# Patient Record
Sex: FEMALE | Race: OTHER
[De-identification: names, ages, dates, MRNs, and addresses within clinical notes are randomized per-mention and may not be internally consistent; named-entity substitution may affect disease eponyms.]

---

## 2017-04-17 ENCOUNTER — HOSPITAL ENCOUNTER (EMERGENCY)
Dept: HOSPITAL 62 - ER | Age: 47
Discharge: HOME | End: 2017-04-17
Payer: COMMERCIAL

## 2017-04-17 VITALS — SYSTOLIC BLOOD PRESSURE: 119 MMHG | DIASTOLIC BLOOD PRESSURE: 68 MMHG

## 2017-04-17 DIAGNOSIS — R06.02: ICD-10-CM

## 2017-04-17 DIAGNOSIS — J40: Primary | ICD-10-CM

## 2017-04-17 LAB
ALBUMIN SERPL-MCNC: 4.3 G/DL (ref 3.5–5)
ALP SERPL-CCNC: 93 U/L (ref 38–126)
ALT SERPL-CCNC: 32 U/L (ref 9–52)
ANION GAP SERPL CALC-SCNC: 14 MMOL/L (ref 5–19)
AST SERPL-CCNC: 20 U/L (ref 14–36)
BASOPHILS # BLD AUTO: 0 10^3/UL (ref 0–0.2)
BASOPHILS NFR BLD AUTO: 0.4 % (ref 0–2)
BILIRUB DIRECT SERPL-MCNC: 0.2 MG/DL (ref 0–0.4)
BILIRUB SERPL-MCNC: 0.5 MG/DL (ref 0.2–1.3)
BUN SERPL-MCNC: 5 MG/DL (ref 7–20)
CALCIUM: 9.6 MG/DL (ref 8.4–10.2)
CHLORIDE SERPL-SCNC: 106 MMOL/L (ref 98–107)
CK SERPL-CCNC: 73 U/L (ref 30–135)
CO2 SERPL-SCNC: 25 MMOL/L (ref 22–30)
CREAT SERPL-MCNC: 0.55 MG/DL (ref 0.52–1.25)
EOSINOPHIL # BLD AUTO: 0.3 10^3/UL (ref 0–0.6)
EOSINOPHIL NFR BLD AUTO: 3 % (ref 0–6)
ERYTHROCYTE [DISTWIDTH] IN BLOOD BY AUTOMATED COUNT: 14.8 % (ref 11.5–14)
GLUCOSE SERPL-MCNC: 118 MG/DL (ref 75–110)
HCT VFR BLD CALC: 35 % (ref 36–47)
HGB BLD-MCNC: 12.1 G/DL (ref 12–15.5)
HGB HCT DIFFERENCE: 1.3
LYMPHOCYTES # BLD AUTO: 3 10^3/UL (ref 0.5–4.7)
LYMPHOCYTES NFR BLD AUTO: 27.7 % (ref 13–45)
MCH RBC QN AUTO: 27.4 PG (ref 27–33.4)
MCHC RBC AUTO-ENTMCNC: 34.6 G/DL (ref 32–36)
MCV RBC AUTO: 79 FL (ref 80–97)
MONOCYTES # BLD AUTO: 0.7 10^3/UL (ref 0.1–1.4)
MONOCYTES NFR BLD AUTO: 6.9 % (ref 3–13)
NEUTROPHILS # BLD AUTO: 6.6 10^3/UL (ref 1.7–8.2)
NEUTS SEG NFR BLD AUTO: 62 % (ref 42–78)
POTASSIUM SERPL-SCNC: 3.8 MMOL/L (ref 3.6–5)
PROT SERPL-MCNC: 7.7 G/DL (ref 6.3–8.2)
RBC # BLD AUTO: 4.43 10^6/UL (ref 3.72–5.28)
SODIUM SERPL-SCNC: 144.8 MMOL/L (ref 137–145)
TROPONIN I SERPL-MCNC: < 0.012 NG/ML
WBC # BLD AUTO: 10.7 10^3/UL (ref 4–10.5)

## 2017-04-17 PROCEDURE — 85379 FIBRIN DEGRADATION QUANT: CPT

## 2017-04-17 PROCEDURE — 84484 ASSAY OF TROPONIN QUANT: CPT

## 2017-04-17 PROCEDURE — 82550 ASSAY OF CK (CPK): CPT

## 2017-04-17 PROCEDURE — 99285 EMERGENCY DEPT VISIT HI MDM: CPT

## 2017-04-17 PROCEDURE — 93005 ELECTROCARDIOGRAM TRACING: CPT

## 2017-04-17 PROCEDURE — 36415 COLL VENOUS BLD VENIPUNCTURE: CPT

## 2017-04-17 PROCEDURE — 80053 COMPREHEN METABOLIC PANEL: CPT

## 2017-04-17 PROCEDURE — 83880 ASSAY OF NATRIURETIC PEPTIDE: CPT

## 2017-04-17 PROCEDURE — 84703 CHORIONIC GONADOTROPIN ASSAY: CPT

## 2017-04-17 PROCEDURE — 85025 COMPLETE CBC W/AUTO DIFF WBC: CPT

## 2017-04-17 PROCEDURE — 71020: CPT

## 2017-04-17 PROCEDURE — 94640 AIRWAY INHALATION TREATMENT: CPT

## 2017-04-17 PROCEDURE — 93010 ELECTROCARDIOGRAM REPORT: CPT

## 2017-04-17 NOTE — EKG REPORT
SEVERITY:- BORDERLINE ECG -

SINUS RHYTHM

BORDERLINE T ABNORMALITIES, INFERIOR LEADS

:

Confirmed by: Makenzie Gooden 17-Apr-2017 08:41:06

## 2017-04-17 NOTE — ER DOCUMENT REPORT
ED Respiratory Problem





- General


Chief Complaint: Shortness Of Breath


Stated Complaint: DIFFICULTY BREATHING


Mode of Arrival: Ambulatory


Information source: Patient


TRAVEL OUTSIDE OF THE [place] IN LAST 30 DAYS: No





- HPI


Patient complains to provider of: Cough, Short of breath


Notes: 


Patient arrives with complaints of chest tightness and shortness of breath that 

started earlier today.  She states for the last few days she has had a cough.  

She says she's had some chest congestion.  She had 3 episodes throughout the 

evening where she felt short of breath and felt wheezy and felt like she was 

having chest tightness.  She denies any actual chest pain.  Right now she still 

feels some mild shortness of breath.  She denies any fever.  She denies any 

history of asthma, hypertension, high cholesterol, CAD, smoking, diabetes.  She 

denies any leg pain or swelling, no hormone use, no cancer, no history of DVT 

or PE.  She did recently drive from [place] to [place] to see her son.  

She is actually supposed to drive back home today.  She states that she used a 

nephew's inhaler which seemed to make her symptoms better when she was having 

one of her "attacks ".  Patient denies any abdominal pain.  She denies any 

nausea, vomiting, diarrhea.  No rash.  Nothing in particular seems to make her 

symptoms better or worse.





- Related Data


Allergies/Adverse Reactions: 


 





No Known Allergies Allergy (Verified 04/17/17 03:55)


 











Past Medical History





- Social History


Smoking Status: Unknown if Ever Smoked


Family History: Reviewed & Not Pertinent


Renal/ Medical History: Denies: Hx Peritoneal Dialysis





Review of Systems





- Review of Systems


-: Yes All other systems reviewed and negative





Physical Exam





- Vital signs


Vitals: 


 











Temp Pulse Resp BP Pulse Ox


 


 97.8 F   96   24 H  110/66   99 


 


 04/17/17 03:55  04/17/17 03:55  04/17/17 03:55  04/17/17 03:55  04/17/17 03:55














- Notes


Notes: 


GENERAL: alert, cooperative, nontoxic, no distress.


HEAD: normocephalic, atraumatic


EYES: conjunctiva pink without discharge, no external redness or swelling.


EARS: no external swelling, no external redness


NOSE: atraumatic, no external swelling


MOUTH/THROAT: mucous membranes moist and pink, posterior pharynx without 

erythema, swelling, exudate. No trismus or drooling.


NECK: soft, supple, full range of motion, no meningismus.


CHEST: no distress, lungs clear and equal throughout.  No wheezing, rales, 

rhonchi.


CARDIAC: regular rate and rhythm, no murmur, normal capillary refill, normal 

pulses.  No peripheral edema noted.


ABDOMEN: Soft, nontender.


BACK: full range of motion, no CVA tenderness.


EXTREMITIES: full range of motion of all extremities.  No redness, no swelling.


NEURO: alert and oriented 3, no focal deficits, full range of motion of all 

extremities.


PYSCH: appropriate mood, affect.  Patient is cooperative.


SKIN: pink, warm, dry, no rash.





Course





- Re-evaluation


Re-evalutation: 


04/17/17 05:37


Patient is nontoxic.  Stable vitals.  She is not hypoxic.  Patient states she's 

had a cough and congestion for the last several days has had a few episodes of 

wheezing and feeling like she was having trouble breathing earlier today.  

Patient has no CAD risk factors.  She has a hard score of 2.  EKG is 

unremarkable for acute findings.  Troponin is negative.  The patient has no d-

dimer risk factors aside from recent long drive.  PE is unlikely in this 

patient and her d-dimer is negative.  The patient likely has bronchitis.  She 

was given breathing treatment and steroids here in the emergency department.  

At this point patient can be discharged home wit steroids and an inhaler as 

well as some cough medication.  She was instructed to follow-up with her 

primary care doctor at the next available appointment.  Follow up sooner if she 

develops chest pain, significant difficulty breathing, high fevers, or any 

further concerns.





The patient's emergency department workup and current diagnosis were explained 

to the patient and or family.  Follow-up instructions were provided.  

Medications if prescribed were discussed. Instructions for when to return to 

the emergency department including specific  worrisome symptoms were discussed 

with the patient and/or family.





04/17/17 05:41


Heart Score


2 points


Low Score (0-3 points)





Risk of MACE of 0.9-1.7%.





- Vital Signs


Vital signs: 


 











Temp Pulse Resp BP Pulse Ox


 


 97.8 F   96   24 H  110/66   99 


 


 04/17/17 03:55  04/17/17 03:55  04/17/17 03:55  04/17/17 03:55  04/17/17 03:55














- Laboratory


Result Diagrams: 


 04/17/17 04:50





 04/17/17 04:50


Laboratory results interpreted by me: 


 











  04/17/17 04/17/17





  04:50 04:50


 


WBC  10.7 H 


 


Hct  35.0 L 


 


MCV  79 L 


 


RDW  14.8 H 


 


BUN   5 L


 


Glucose   118 H














- Diagnostic Test


Radiology reviewed: Image reviewed, Reports reviewed - Chest x-ray clear





- EKG Interpretation by Me


EKG shows normal: Sinus rhythm, Axis, Intervals, QRS Complexes


Rate: Normal


When compared to previous EKG there are: Previous EKG unavailable


Additional EKG results interpreted by me: 





04/17/17 05:37


Slightly flattened T waves in lead 3 and aVF





Discharge





- Discharge


Clinical Impression: 


 Bronchitis





Condition: Stable


Disposition: HOME, SELF-CARE


Instructions:  Bronchitis ([facility])


Additional Instructions: 


Take medications as prescribed.  Follow-up with your family doctor at the next 

available appointment.  Follow-up sooner for difficulty breathing, chest pain, 

high fever, or any further concerns.





The medication you were prescribed today may cause drowsiness.  Do not drive or 

operate heavy machinery while taking this medication.


Prescriptions: 


Hydrocodone Bit/Homatropine [Hycodan Syrup 5-1.5 mg/5 ml Ud Cup] 5 ml PO Q4HP 

PRN #120 ml


 PRN Reason: 


RX: Albuterol Sulfate [Proair HFA Inhalation Aerosol 8.5 gm MDI] 2 puff IH Q4 

PRN #1 mdi


 PRN Reason: 


RX: Prednisone 60 mg PO DAILY #15 tablet

## 2024-05-13 ENCOUNTER — APPOINTMENT (OUTPATIENT)
Dept: NEUROPSYCHOLOGY | Facility: CLINIC | Age: 54
End: 2024-05-13
Payer: MEDICAID

## 2024-05-13 PROBLEM — Z00.00 ENCOUNTER FOR PREVENTIVE HEALTH EXAMINATION: Status: ACTIVE | Noted: 2024-05-13

## 2024-05-13 PROCEDURE — 90791 PSYCH DIAGNOSTIC EVALUATION: CPT

## 2024-05-21 ENCOUNTER — APPOINTMENT (OUTPATIENT)
Dept: NEUROPSYCHOLOGY | Facility: CLINIC | Age: 54
End: 2024-05-21
Payer: MEDICAID

## 2024-05-21 PROCEDURE — 90834 PSYTX W PT 45 MINUTES: CPT

## 2024-05-28 ENCOUNTER — APPOINTMENT (OUTPATIENT)
Dept: NEUROPSYCHOLOGY | Facility: CLINIC | Age: 54
End: 2024-05-28

## 2024-05-30 ENCOUNTER — APPOINTMENT (OUTPATIENT)
Dept: NEUROPSYCHOLOGY | Facility: CLINIC | Age: 54
End: 2024-05-30

## 2024-06-03 ENCOUNTER — APPOINTMENT (OUTPATIENT)
Dept: NEUROPSYCHOLOGY | Facility: CLINIC | Age: 54
End: 2024-06-03
Payer: MEDICAID

## 2024-06-03 PROCEDURE — 90834 PSYTX W PT 45 MINUTES: CPT

## 2024-06-13 ENCOUNTER — APPOINTMENT (OUTPATIENT)
Dept: NEUROPSYCHOLOGY | Facility: CLINIC | Age: 54
End: 2024-06-13

## 2024-06-18 ENCOUNTER — APPOINTMENT (OUTPATIENT)
Dept: NEUROPSYCHOLOGY | Facility: CLINIC | Age: 54
End: 2024-06-18
Payer: MEDICAID

## 2024-06-18 PROCEDURE — 90834 PSYTX W PT 45 MINUTES: CPT

## 2024-06-25 ENCOUNTER — APPOINTMENT (OUTPATIENT)
Dept: NEUROPSYCHOLOGY | Facility: CLINIC | Age: 54
End: 2024-06-25

## 2024-06-26 ENCOUNTER — APPOINTMENT (OUTPATIENT)
Dept: NEUROPSYCHOLOGY | Facility: CLINIC | Age: 54
End: 2024-06-26
Payer: MEDICAID

## 2024-06-26 PROCEDURE — 90834 PSYTX W PT 45 MINUTES: CPT

## 2024-07-02 ENCOUNTER — APPOINTMENT (OUTPATIENT)
Dept: NEUROPSYCHOLOGY | Facility: CLINIC | Age: 54
End: 2024-07-02
Payer: MEDICAID

## 2024-07-02 PROCEDURE — 90834 PSYTX W PT 45 MINUTES: CPT | Mod: 95

## 2024-07-09 ENCOUNTER — APPOINTMENT (OUTPATIENT)
Dept: NEUROPSYCHOLOGY | Facility: CLINIC | Age: 54
End: 2024-07-09
Payer: MEDICAID

## 2024-07-09 PROCEDURE — 90834 PSYTX W PT 45 MINUTES: CPT

## 2024-07-16 ENCOUNTER — APPOINTMENT (OUTPATIENT)
Dept: NEUROPSYCHOLOGY | Facility: CLINIC | Age: 54
End: 2024-07-16
Payer: MEDICAID

## 2024-07-16 PROCEDURE — 90834 PSYTX W PT 45 MINUTES: CPT

## 2024-07-23 ENCOUNTER — APPOINTMENT (OUTPATIENT)
Dept: NEUROPSYCHOLOGY | Facility: CLINIC | Age: 54
End: 2024-07-23
Payer: MEDICAID

## 2024-07-23 PROCEDURE — 90834 PSYTX W PT 45 MINUTES: CPT

## 2024-07-30 ENCOUNTER — APPOINTMENT (OUTPATIENT)
Dept: NEUROPSYCHOLOGY | Facility: CLINIC | Age: 54
End: 2024-07-30
Payer: MEDICAID

## 2024-07-30 PROCEDURE — 90834 PSYTX W PT 45 MINUTES: CPT

## 2024-08-06 ENCOUNTER — APPOINTMENT (OUTPATIENT)
Dept: NEUROPSYCHOLOGY | Facility: CLINIC | Age: 54
End: 2024-08-06

## 2024-08-13 ENCOUNTER — APPOINTMENT (OUTPATIENT)
Dept: NEUROPSYCHOLOGY | Facility: CLINIC | Age: 54
End: 2024-08-13
Payer: MEDICAID

## 2024-08-13 PROCEDURE — 90834 PSYTX W PT 45 MINUTES: CPT

## 2024-08-20 ENCOUNTER — APPOINTMENT (OUTPATIENT)
Dept: NEUROPSYCHOLOGY | Facility: CLINIC | Age: 54
End: 2024-08-20
Payer: MEDICAID

## 2024-08-20 PROCEDURE — 90834 PSYTX W PT 45 MINUTES: CPT

## 2024-08-27 ENCOUNTER — APPOINTMENT (OUTPATIENT)
Dept: NEUROPSYCHOLOGY | Facility: CLINIC | Age: 54
End: 2024-08-27
Payer: MEDICAID

## 2024-08-27 PROCEDURE — 90834 PSYTX W PT 45 MINUTES: CPT

## 2024-09-03 ENCOUNTER — APPOINTMENT (OUTPATIENT)
Dept: NEUROPSYCHOLOGY | Facility: CLINIC | Age: 54
End: 2024-09-03
Payer: MEDICAID

## 2024-09-03 PROCEDURE — 90834 PSYTX W PT 45 MINUTES: CPT

## 2024-09-10 ENCOUNTER — APPOINTMENT (OUTPATIENT)
Dept: NEUROPSYCHOLOGY | Facility: CLINIC | Age: 54
End: 2024-09-10
Payer: MEDICAID

## 2024-09-10 PROCEDURE — 90834 PSYTX W PT 45 MINUTES: CPT

## 2024-09-17 ENCOUNTER — APPOINTMENT (OUTPATIENT)
Dept: NEUROPSYCHOLOGY | Facility: CLINIC | Age: 54
End: 2024-09-17
Payer: MEDICAID

## 2024-09-17 PROCEDURE — 90834 PSYTX W PT 45 MINUTES: CPT

## 2024-09-24 ENCOUNTER — APPOINTMENT (OUTPATIENT)
Dept: NEUROPSYCHOLOGY | Facility: CLINIC | Age: 54
End: 2024-09-24
Payer: MEDICAID

## 2024-09-24 PROCEDURE — 90834 PSYTX W PT 45 MINUTES: CPT

## 2024-10-01 ENCOUNTER — APPOINTMENT (OUTPATIENT)
Dept: NEUROPSYCHOLOGY | Facility: CLINIC | Age: 54
End: 2024-10-01
Payer: MEDICAID

## 2024-10-01 PROCEDURE — 90834 PSYTX W PT 45 MINUTES: CPT

## 2024-10-08 ENCOUNTER — APPOINTMENT (OUTPATIENT)
Dept: NEUROPSYCHOLOGY | Facility: CLINIC | Age: 54
End: 2024-10-08
Payer: MEDICAID

## 2024-10-08 PROCEDURE — 90834 PSYTX W PT 45 MINUTES: CPT

## 2024-10-15 ENCOUNTER — APPOINTMENT (OUTPATIENT)
Dept: NEUROPSYCHOLOGY | Facility: CLINIC | Age: 54
End: 2024-10-15

## 2024-10-22 ENCOUNTER — APPOINTMENT (OUTPATIENT)
Dept: NEUROPSYCHOLOGY | Facility: CLINIC | Age: 54
End: 2024-10-22
Payer: MEDICAID

## 2024-10-22 PROCEDURE — 90834 PSYTX W PT 45 MINUTES: CPT

## 2024-10-29 ENCOUNTER — APPOINTMENT (OUTPATIENT)
Dept: NEUROPSYCHOLOGY | Facility: CLINIC | Age: 54
End: 2024-10-29
Payer: MEDICAID

## 2024-10-29 PROCEDURE — 90834 PSYTX W PT 45 MINUTES: CPT

## 2024-11-05 ENCOUNTER — APPOINTMENT (OUTPATIENT)
Dept: NEUROPSYCHOLOGY | Facility: CLINIC | Age: 54
End: 2024-11-05
Payer: MEDICAID

## 2024-11-05 PROCEDURE — 90834 PSYTX W PT 45 MINUTES: CPT

## 2024-11-12 ENCOUNTER — APPOINTMENT (OUTPATIENT)
Dept: NEUROPSYCHOLOGY | Facility: CLINIC | Age: 54
End: 2024-11-12
Payer: MEDICAID

## 2024-11-12 PROCEDURE — 90834 PSYTX W PT 45 MINUTES: CPT

## 2024-11-18 ENCOUNTER — APPOINTMENT (OUTPATIENT)
Dept: NEUROPSYCHOLOGY | Facility: CLINIC | Age: 54
End: 2024-11-18
Payer: MEDICAID

## 2024-11-18 PROCEDURE — 90834 PSYTX W PT 45 MINUTES: CPT

## 2024-11-26 ENCOUNTER — APPOINTMENT (OUTPATIENT)
Dept: NEUROPSYCHOLOGY | Facility: CLINIC | Age: 54
End: 2024-11-26
Payer: MEDICAID

## 2024-11-26 PROCEDURE — 90834 PSYTX W PT 45 MINUTES: CPT

## 2024-12-03 ENCOUNTER — APPOINTMENT (OUTPATIENT)
Dept: NEUROPSYCHOLOGY | Facility: CLINIC | Age: 54
End: 2024-12-03

## 2024-12-10 ENCOUNTER — APPOINTMENT (OUTPATIENT)
Dept: NEUROPSYCHOLOGY | Facility: CLINIC | Age: 54
End: 2024-12-10

## 2024-12-17 ENCOUNTER — APPOINTMENT (OUTPATIENT)
Dept: NEUROPSYCHOLOGY | Facility: CLINIC | Age: 54
End: 2024-12-17

## 2024-12-24 ENCOUNTER — APPOINTMENT (OUTPATIENT)
Dept: NEUROPSYCHOLOGY | Facility: CLINIC | Age: 54
End: 2024-12-24

## 2024-12-31 ENCOUNTER — APPOINTMENT (OUTPATIENT)
Dept: NEUROPSYCHOLOGY | Facility: CLINIC | Age: 54
End: 2024-12-31

## 2025-01-07 ENCOUNTER — APPOINTMENT (OUTPATIENT)
Dept: NEUROPSYCHOLOGY | Facility: CLINIC | Age: 55
End: 2025-01-07

## 2025-01-09 ENCOUNTER — EMERGENCY (EMERGENCY)
Facility: HOSPITAL | Age: 55
LOS: 0 days | Discharge: HOME | End: 2025-01-09
Attending: STUDENT IN AN ORGANIZED HEALTH CARE EDUCATION/TRAINING PROGRAM
Payer: MEDICAID

## 2025-01-09 VITALS
HEART RATE: 80 BPM | TEMPERATURE: 98 F | DIASTOLIC BLOOD PRESSURE: 55 MMHG | SYSTOLIC BLOOD PRESSURE: 126 MMHG | WEIGHT: 210.1 LBS | OXYGEN SATURATION: 99 % | RESPIRATION RATE: 18 BRPM

## 2025-01-09 VITALS — OXYGEN SATURATION: 99 % | HEART RATE: 89 BPM | RESPIRATION RATE: 20 BRPM

## 2025-01-09 DIAGNOSIS — I10 ESSENTIAL (PRIMARY) HYPERTENSION: ICD-10-CM

## 2025-01-09 DIAGNOSIS — J45.909 UNSPECIFIED ASTHMA, UNCOMPLICATED: ICD-10-CM

## 2025-01-09 DIAGNOSIS — R06.02 SHORTNESS OF BREATH: ICD-10-CM

## 2025-01-09 DIAGNOSIS — L50.9 URTICARIA, UNSPECIFIED: ICD-10-CM

## 2025-01-09 DIAGNOSIS — R21 RASH AND OTHER NONSPECIFIC SKIN ERUPTION: ICD-10-CM

## 2025-01-09 PROCEDURE — 99284 EMERGENCY DEPT VISIT MOD MDM: CPT

## 2025-01-09 PROCEDURE — 99283 EMERGENCY DEPT VISIT LOW MDM: CPT

## 2025-01-09 RX ORDER — DIPHENHYDRAMINE HCL 25 MG
25 TABLET ORAL ONCE
Refills: 0 | Status: COMPLETED | OUTPATIENT
Start: 2025-01-09 | End: 2025-01-09

## 2025-01-09 RX ORDER — DEXAMETHASONE SODIUM PHOSPHATE 4 MG/ML
10 VIAL (ML) INJECTION ONCE
Refills: 0 | Status: COMPLETED | OUTPATIENT
Start: 2025-01-09 | End: 2025-01-09

## 2025-01-09 RX ORDER — PREDNISONE 5 MG
2 TABLET ORAL
Qty: 6 | Refills: 0
Start: 2025-01-09 | End: 2025-01-11

## 2025-01-09 RX ADMIN — Medication 25 MILLIGRAM(S): at 21:08

## 2025-01-09 RX ADMIN — Medication 10 MILLIGRAM(S): at 20:48

## 2025-01-09 NOTE — ED ADULT TRIAGE NOTE - CHIEF COMPLAINT QUOTE
Patient bibems in NAD a+ox3 co possible allergic reaction, states she had some chicken then developed hives with itching - no known allergies and denies any new meds/food- 25mg benadryl taken by patient prior to arrival. Denies vomiting or trouble swallowing- pt speaking in full sentences. Pt endorses some SOB- evaluated and cleared by leticia TRUJILLO

## 2025-01-09 NOTE — ED PROVIDER NOTE - PHYSICAL EXAMINATION
VITAL SIGNS: I have reviewed nursing notes and confirm.  CONSTITUTIONAL: well-appearing, non-toxic, NAD  SKIN: Warm dry, normal skin turgor, urticarial rash to bilatera UE, neck, face.   HEAD: NCAT  EYES: EOMI, PERRLA, no scleral icterus, normal conjunctiva  ENT: Moist mucous membranes, OR clear. Normal pharynx, no edema.   NECK: Supple; non tender. Full ROM. No cervical LAD  CARD: RRR, no murmurs, rubs or gallops  RESP: clear to ausculation b/l.  No rales, rhonchi, or wheezing. No increased WOB.  ABD: soft, + BS, non-tender, non-distended, no rebound or guarding. No CVA tenderness  EXT: Full ROM, no bony tenderness, pulses intact in bilateral UE and LE, no pedal edema, no calf tenderness  NEURO: normal motor. normal sensory. Normal gait.  PSYCH: Cooperative, appropriate.

## 2025-01-09 NOTE — ED PROVIDER NOTE - CARE PROVIDER_API CALL
HECTOR CLARK  [address] [place] [id number]  Phone: ([phone]  Fax: ()-  Follow Up Time: 7-10 Days

## 2025-01-09 NOTE — ED PROVIDER NOTE - NSFOLLOWUPINSTRUCTIONS_ED_ALL_ED_FT
Our Emergency Department Referral Coordinators will be reaching out to you in the next 24-48 hours from 9:00am to 5:00pm to schedule a follow up appointment. Please expect a phone call from the hospital in that time frame. If you do not receive a call or if you have any questions or concerns, you can reach them at   [phone].    Hives  Hives (urticaria) are itchy, red, swollen areas of skin. They can show up on any part of the body. They often fade within 24 hours (acute hives). If you get new hives after the old ones fade and the cycle goes on for many days or weeks, it is called chronic hives. Hives do not spread from person to person (are not contagious).    Hives can happen when your body reacts to something you are allergic to (allergen) or to something that irritates your skin. When you are exposed to something that triggers hives, your body releases a chemical called histamine. This causes redness, itching, and swelling. Hives can show up right after you are exposed to a trigger or hours later.    What are the causes?  Hives may be caused by:  Food allergies.  Insect bites or stings.  Allergies to pollen or pets.  Spending time in sunlight, heat, or cold (exposure).  Exercise.  Stress.  You can also get hives from other conditions and treatments. These include:  Viruses, such as the common cold.  Bacterial infections, such as urinary tract infections and strep throat.  Certain medicines.  Contact with latex or chemicals.  Allergy shots.  Blood transfusions.  In some cases, the cause of hives is not known (idiopathic hives).    What increases the risk?  You are more likely to get hives if:  You are female.  You have food allergies. Hives are more common if you are allergic to citrus fruits, milk, eggs, peanuts, tree nuts, or shellfish.  You are allergic to:  Medicines.  Latex.  Insects.  Animals.  Pollen.  What are the signs or symptoms?  A red rash on a person's upper arm.  Common symptoms of hives include raised, itchy, red or white bumps or patches on your skin. These areas may:  Become large and swollen (welts).  Quickly change shape and location. This may happen more than once.  Be separate hives or connect over a large area of skin.  Sting or become painful.  Turn white when pressed in the center (zayra).  In severe cases, your hands, feet, and face may also become swollen. This may happen if hives form deeper in your skin.    How is this diagnosed?  Hives may be diagnosed based on your symptoms, medical history, and a physical exam.  You may have skin, pee (urine), or blood tests done. These can help find out what is causing your hives and rule out other health issues.  You may also have a biopsy done. This is when a small piece of skin is removed for testing.  How is this treated?  Treatment for hives depends on the cause and on how severe your symptoms are. You may be told to use cool, wet cloths (cool compresses) or to take cool showers to relieve itching. Treatment may also include:  Medicines to help:  Relieve itching (antihistamines).  Reduce swelling (corticosteroids).  Treat infection (antibiotics).  An injectable medicine called omalizumab. You may need this if you have chronic idiopathic hives and still have symptoms even after you are treated with antihistamines.  In severe cases, you may need to use a device filled with medicine that gives an emergency shot of epinephrine (auto-injector pen) to prevent a very bad allergic reaction (anaphylactic reaction).    Follow these instructions at home:  Medicines    Take and apply over-the-counter and prescription medicines only as told by your health care provider.  If you were prescribed antibiotics, take them as told by your provider. Do not stop using the antibiotic even if you start to feel better.  Skin care    Apply cool compresses to the affected areas.  Do not scratch or rub your skin.  General instructions    Do not take hot showers or baths. This can make itching worse.  Do not wear tight-fitting clothing.  Use sunscreen. Wear protective clothing when you are outside.  Avoid anything that causes your hives. Keep a journal to help track what causes your hives. Write down:  What medicines you take.  What you eat and drink.  What products you use on your skin.  Keep all follow-up visits. Your provider will track how well treatment is working.  Contact a health care provider if:  Your symptoms do not get better with medicine.  Your joints are painful or swollen.  You have a fever.  You have pain in your abdomen.  Get help right away if:  Your tongue, lips, or eyelids swell.  Your chest or throat feels tight.  You have trouble breathing or swallowing.  These symptoms may be an emergency. Use the auto-injector pen right away. Then call 911.  Do not wait to see if the symptoms will go away.  Do not drive yourself to the hospital.  This information is not intended to replace advice given to you by your health care provider. Make sure you discuss any questions you have with your health care provider.

## 2025-01-09 NOTE — ED PROVIDER NOTE - OBJECTIVE STATEMENT
54y female with PMHx of HTN, asthma (no prior intubations), no known allergies who presents for allergic reaction. Reports she ate chicken for dinner, felt itching to her upper extremities and lower extremities associated with urticarial rash. No headache, vision changes, chest pain, SOB, difficulty breathing, wheezing, cough, nausea, vomiting, diarrhea, abdominal pain. Took 25 mg of benadryl prior to arrival.

## 2025-01-09 NOTE — ED PROVIDER NOTE - CLINICAL SUMMARY MEDICAL DECISION MAKING FREE TEXT BOX
54-year-old female, past medical history of hypertension, asthma, presenting for itchiness to her hands and feet associated with rash.  She states she has no known allergies.  She ate chicken for dinner and started feeling itchiness to her hands and feet and noticed a rash.  She did 25 mg of Benadryl and came to the ED for further evaluation.  States that since taking the Benadryl she does feel better.  Denies chest pain, shortness of breath, difficulty swallowing, nausea, vomiting, abdominal pain, weakness, headache, dizziness.  Well-appearing on exam.  In no acute distress.  Pharynx clear.  Normal phonation.  Tolerating secretions.  Heart RRR.  Lungs CTAB.  Abdomen soft nondistended nontender.  Hives resolved at time of exam.  Medications given and effects reassessed.  Discussed return precautions and follow-up outpatient.  Medication sent to pharmacy.  Patient feels improved and would like to be discharged.

## 2025-01-09 NOTE — ED PROVIDER NOTE - PATIENT PORTAL LINK FT
You can access the FollowMyHealth Patient Portal offered by [facility] by registering at the following website: [email or web address]. By joining [employer] [employer]’s FollowMyHealth portal, you will also be able to view your health information using other applications (apps) compatible with our system.

## 2025-01-13 ENCOUNTER — APPOINTMENT (OUTPATIENT)
Dept: PEDIATRIC ALLERGY IMMUNOLOGY | Facility: CLINIC | Age: 55
End: 2025-01-13

## 2025-01-13 NOTE — CHART NOTE - NSCHARTNOTEFT_GEN_A_CORE
sent to Nikole Doan 1/10 - MARK / appt scheduled on 1/13 with Dr. Hernandez 1/13 - MARK (Allergy Referral)

## 2025-01-14 ENCOUNTER — APPOINTMENT (OUTPATIENT)
Dept: NEUROPSYCHOLOGY | Facility: CLINIC | Age: 55
End: 2025-01-14
Payer: MEDICAID

## 2025-01-14 PROCEDURE — 90834 PSYTX W PT 45 MINUTES: CPT

## 2025-01-17 ENCOUNTER — NON-APPOINTMENT (OUTPATIENT)
Age: 55
End: 2025-01-17

## 2025-01-17 ENCOUNTER — APPOINTMENT (OUTPATIENT)
Dept: PEDIATRIC ALLERGY IMMUNOLOGY | Facility: CLINIC | Age: 55
End: 2025-01-17
Payer: MEDICAID

## 2025-01-17 VITALS
SYSTOLIC BLOOD PRESSURE: 140 MMHG | WEIGHT: 212 LBS | DIASTOLIC BLOOD PRESSURE: 80 MMHG | HEIGHT: 61 IN | BODY MASS INDEX: 40.02 KG/M2

## 2025-01-17 DIAGNOSIS — J45.30 MILD PERSISTENT ASTHMA, UNCOMPLICATED: ICD-10-CM

## 2025-01-17 DIAGNOSIS — L29.9 PRURITUS, UNSPECIFIED: ICD-10-CM

## 2025-01-17 DIAGNOSIS — R21 RASH AND OTHER NONSPECIFIC SKIN ERUPTION: ICD-10-CM

## 2025-01-17 DIAGNOSIS — Z83.6 FAMILY HISTORY OF OTHER DISEASES OF THE RESPIRATORY SYSTEM: ICD-10-CM

## 2025-01-17 DIAGNOSIS — J30.1 ALLERGIC RHINITIS DUE TO POLLEN: ICD-10-CM

## 2025-01-17 DIAGNOSIS — H10.13 ACUTE ATOPIC CONJUNCTIVITIS, BILATERAL: ICD-10-CM

## 2025-01-17 DIAGNOSIS — Z82.5 FAMILY HISTORY OF ASTHMA AND OTHER CHRONIC LOWER RESPIRATORY DISEASES: ICD-10-CM

## 2025-01-17 PROCEDURE — 99203 OFFICE O/P NEW LOW 30 MIN: CPT

## 2025-01-17 RX ORDER — ALBUTEROL 90 MCG
90 AEROSOL (GRAM) INHALATION
Refills: 0 | Status: ACTIVE | COMMUNITY

## 2025-01-21 ENCOUNTER — APPOINTMENT (OUTPATIENT)
Dept: NEUROPSYCHOLOGY | Facility: CLINIC | Age: 55
End: 2025-01-21

## 2025-01-28 ENCOUNTER — APPOINTMENT (OUTPATIENT)
Dept: NEUROPSYCHOLOGY | Facility: CLINIC | Age: 55
End: 2025-01-28
Payer: MEDICAID

## 2025-01-28 PROCEDURE — 90834 PSYTX W PT 45 MINUTES: CPT

## 2025-02-04 ENCOUNTER — APPOINTMENT (OUTPATIENT)
Dept: NEUROPSYCHOLOGY | Facility: CLINIC | Age: 55
End: 2025-02-04
Payer: MEDICAID

## 2025-02-04 PROCEDURE — 90834 PSYTX W PT 45 MINUTES: CPT

## 2025-02-11 ENCOUNTER — APPOINTMENT (OUTPATIENT)
Dept: NEUROPSYCHOLOGY | Facility: CLINIC | Age: 55
End: 2025-02-11
Payer: MEDICAID

## 2025-02-11 PROCEDURE — 90834 PSYTX W PT 45 MINUTES: CPT

## 2025-02-18 ENCOUNTER — APPOINTMENT (OUTPATIENT)
Dept: NEUROPSYCHOLOGY | Facility: CLINIC | Age: 55
End: 2025-02-18
Payer: MEDICAID

## 2025-02-18 PROCEDURE — 90834 PSYTX W PT 45 MINUTES: CPT

## 2025-02-25 ENCOUNTER — APPOINTMENT (OUTPATIENT)
Dept: NEUROPSYCHOLOGY | Facility: CLINIC | Age: 55
End: 2025-02-25
Payer: MEDICAID

## 2025-02-25 PROCEDURE — 90834 PSYTX W PT 45 MINUTES: CPT

## 2025-02-25 NOTE — ED ADULT NURSE NOTE - NS PRO PASSIVE SMOKE EXP
I discussed wound care precautions; specifically, that all wounds have risk of infection despite efforts to cleanse and debride the wound; and there is a risk of an occult foreign body (and thus increased risk of infection) despite a negative examination.  I discussed with patient need to return for any signs of infection, specifically redness, increased pain, fever, drainage of pus, or any concern, immediately.    
Unknown

## 2025-03-04 ENCOUNTER — APPOINTMENT (OUTPATIENT)
Dept: NEUROPSYCHOLOGY | Facility: CLINIC | Age: 55
End: 2025-03-04
Payer: MEDICAID

## 2025-03-04 PROCEDURE — 90834 PSYTX W PT 45 MINUTES: CPT

## 2025-03-11 ENCOUNTER — APPOINTMENT (OUTPATIENT)
Dept: NEUROPSYCHOLOGY | Facility: CLINIC | Age: 55
End: 2025-03-11
Payer: MEDICAID

## 2025-03-11 PROCEDURE — 90834 PSYTX W PT 45 MINUTES: CPT

## 2025-03-18 ENCOUNTER — APPOINTMENT (OUTPATIENT)
Dept: NEUROPSYCHOLOGY | Facility: CLINIC | Age: 55
End: 2025-03-18
Payer: MEDICAID

## 2025-03-18 PROCEDURE — 90834 PSYTX W PT 45 MINUTES: CPT

## 2025-03-25 ENCOUNTER — APPOINTMENT (OUTPATIENT)
Dept: NEUROPSYCHOLOGY | Facility: CLINIC | Age: 55
End: 2025-03-25
Payer: MEDICAID

## 2025-03-25 PROCEDURE — 90834 PSYTX W PT 45 MINUTES: CPT

## 2025-04-01 ENCOUNTER — APPOINTMENT (OUTPATIENT)
Dept: NEUROPSYCHOLOGY | Facility: CLINIC | Age: 55
End: 2025-04-01

## 2025-04-08 ENCOUNTER — APPOINTMENT (OUTPATIENT)
Dept: NEUROPSYCHOLOGY | Facility: CLINIC | Age: 55
End: 2025-04-08
Payer: MEDICAID

## 2025-04-08 PROCEDURE — 90834 PSYTX W PT 45 MINUTES: CPT

## 2025-04-15 ENCOUNTER — APPOINTMENT (OUTPATIENT)
Dept: NEUROPSYCHOLOGY | Facility: CLINIC | Age: 55
End: 2025-04-15
Payer: MEDICAID

## 2025-04-15 PROCEDURE — 90834 PSYTX W PT 45 MINUTES: CPT

## 2025-04-22 ENCOUNTER — APPOINTMENT (OUTPATIENT)
Dept: NEUROPSYCHOLOGY | Facility: CLINIC | Age: 55
End: 2025-04-22
Payer: MEDICAID

## 2025-04-22 PROCEDURE — 90834 PSYTX W PT 45 MINUTES: CPT

## 2025-04-29 ENCOUNTER — APPOINTMENT (OUTPATIENT)
Dept: NEUROPSYCHOLOGY | Facility: CLINIC | Age: 55
End: 2025-04-29
Payer: MEDICAID

## 2025-04-29 PROCEDURE — 90834 PSYTX W PT 45 MINUTES: CPT

## 2025-05-06 ENCOUNTER — APPOINTMENT (OUTPATIENT)
Dept: NEUROPSYCHOLOGY | Facility: CLINIC | Age: 55
End: 2025-05-06
Payer: MEDICAID

## 2025-05-06 PROCEDURE — 90834 PSYTX W PT 45 MINUTES: CPT

## 2025-05-13 ENCOUNTER — APPOINTMENT (OUTPATIENT)
Dept: NEUROPSYCHOLOGY | Facility: CLINIC | Age: 55
End: 2025-05-13
Payer: MEDICAID

## 2025-05-13 PROCEDURE — 90834 PSYTX W PT 45 MINUTES: CPT

## 2025-05-20 ENCOUNTER — APPOINTMENT (OUTPATIENT)
Dept: NEUROPSYCHOLOGY | Facility: CLINIC | Age: 55
End: 2025-05-20
Payer: MEDICAID

## 2025-05-20 PROCEDURE — 90834 PSYTX W PT 45 MINUTES: CPT

## 2025-05-27 ENCOUNTER — APPOINTMENT (OUTPATIENT)
Dept: NEUROPSYCHOLOGY | Facility: CLINIC | Age: 55
End: 2025-05-27
Payer: MEDICAID

## 2025-05-27 PROCEDURE — 90834 PSYTX W PT 45 MINUTES: CPT

## 2025-06-03 ENCOUNTER — APPOINTMENT (OUTPATIENT)
Dept: NEUROPSYCHOLOGY | Facility: CLINIC | Age: 55
End: 2025-06-03

## 2025-06-10 ENCOUNTER — APPOINTMENT (OUTPATIENT)
Dept: NEUROPSYCHOLOGY | Facility: CLINIC | Age: 55
End: 2025-06-10
Payer: MEDICAID

## 2025-06-10 PROCEDURE — 90832 PSYTX W PT 30 MINUTES: CPT | Mod: 95

## 2025-06-17 ENCOUNTER — APPOINTMENT (OUTPATIENT)
Dept: NEUROPSYCHOLOGY | Facility: CLINIC | Age: 55
End: 2025-06-17

## 2025-06-24 ENCOUNTER — APPOINTMENT (OUTPATIENT)
Dept: NEUROPSYCHOLOGY | Facility: CLINIC | Age: 55
End: 2025-06-24
Payer: MEDICAID

## 2025-06-24 PROCEDURE — 90832 PSYTX W PT 30 MINUTES: CPT | Mod: 95

## 2025-07-01 ENCOUNTER — APPOINTMENT (OUTPATIENT)
Dept: NEUROPSYCHOLOGY | Facility: CLINIC | Age: 55
End: 2025-07-01
Payer: MEDICAID

## 2025-07-01 PROCEDURE — 90834 PSYTX W PT 45 MINUTES: CPT

## 2025-07-08 ENCOUNTER — APPOINTMENT (OUTPATIENT)
Dept: NEUROPSYCHOLOGY | Facility: CLINIC | Age: 55
End: 2025-07-08
Payer: MEDICAID

## 2025-07-08 PROCEDURE — 90834 PSYTX W PT 45 MINUTES: CPT

## 2025-07-15 ENCOUNTER — APPOINTMENT (OUTPATIENT)
Dept: NEUROPSYCHOLOGY | Facility: CLINIC | Age: 55
End: 2025-07-15

## 2025-07-22 ENCOUNTER — APPOINTMENT (OUTPATIENT)
Dept: NEUROPSYCHOLOGY | Facility: CLINIC | Age: 55
End: 2025-07-22

## 2025-07-29 ENCOUNTER — APPOINTMENT (OUTPATIENT)
Dept: NEUROPSYCHOLOGY | Facility: CLINIC | Age: 55
End: 2025-07-29

## 2025-08-05 ENCOUNTER — APPOINTMENT (OUTPATIENT)
Dept: NEUROPSYCHOLOGY | Facility: CLINIC | Age: 55
End: 2025-08-05
Payer: MEDICAID

## 2025-08-05 PROCEDURE — 90834 PSYTX W PT 45 MINUTES: CPT

## 2025-08-12 ENCOUNTER — APPOINTMENT (OUTPATIENT)
Dept: NEUROPSYCHOLOGY | Facility: CLINIC | Age: 55
End: 2025-08-12
Payer: MEDICAID

## 2025-08-12 PROCEDURE — 90834 PSYTX W PT 45 MINUTES: CPT

## 2025-08-19 ENCOUNTER — APPOINTMENT (OUTPATIENT)
Dept: NEUROPSYCHOLOGY | Facility: CLINIC | Age: 55
End: 2025-08-19

## 2025-08-26 ENCOUNTER — APPOINTMENT (OUTPATIENT)
Dept: NEUROPSYCHOLOGY | Facility: CLINIC | Age: 55
End: 2025-08-26

## 2025-09-02 ENCOUNTER — APPOINTMENT (OUTPATIENT)
Dept: NEUROPSYCHOLOGY | Facility: CLINIC | Age: 55
End: 2025-09-02
Payer: MEDICAID

## 2025-09-02 PROCEDURE — 90834 PSYTX W PT 45 MINUTES: CPT

## 2025-09-09 ENCOUNTER — APPOINTMENT (OUTPATIENT)
Dept: NEUROPSYCHOLOGY | Facility: CLINIC | Age: 55
End: 2025-09-09
Payer: MEDICAID

## 2025-09-09 PROCEDURE — 90834 PSYTX W PT 45 MINUTES: CPT

## 2025-09-16 ENCOUNTER — APPOINTMENT (OUTPATIENT)
Dept: NEUROPSYCHOLOGY | Facility: CLINIC | Age: 55
End: 2025-09-16
Payer: MEDICAID

## 2025-09-16 PROCEDURE — 90834 PSYTX W PT 45 MINUTES: CPT | Mod: 95
